# Patient Record
Sex: MALE | Race: WHITE | NOT HISPANIC OR LATINO | Employment: FULL TIME | URBAN - METROPOLITAN AREA
[De-identification: names, ages, dates, MRNs, and addresses within clinical notes are randomized per-mention and may not be internally consistent; named-entity substitution may affect disease eponyms.]

---

## 2021-06-15 ENCOUNTER — TELEPHONE (OUTPATIENT)
Dept: GASTROENTEROLOGY | Facility: CLINIC | Age: 36
End: 2021-06-15

## 2021-07-12 ENCOUNTER — OFFICE VISIT (OUTPATIENT)
Dept: GASTROENTEROLOGY | Facility: CLINIC | Age: 36
End: 2021-07-12
Payer: MEDICAID

## 2021-07-12 VITALS
WEIGHT: 216.19 LBS | BODY MASS INDEX: 30.95 KG/M2 | OXYGEN SATURATION: 98 % | SYSTOLIC BLOOD PRESSURE: 126 MMHG | HEIGHT: 70 IN | RESPIRATION RATE: 18 BRPM | DIASTOLIC BLOOD PRESSURE: 80 MMHG | HEART RATE: 79 BPM

## 2021-07-12 DIAGNOSIS — K29.50 CHRONIC GASTRITIS WITHOUT BLEEDING, UNSPECIFIED GASTRITIS TYPE: ICD-10-CM

## 2021-07-12 DIAGNOSIS — K21.00 HIATAL HERNIA WITH GERD AND ESOPHAGITIS: Primary | ICD-10-CM

## 2021-07-12 DIAGNOSIS — K44.9 HIATAL HERNIA WITH GERD AND ESOPHAGITIS: Primary | ICD-10-CM

## 2021-07-12 DIAGNOSIS — R68.81 EARLY SATIETY: ICD-10-CM

## 2021-07-12 PROCEDURE — 99203 PR OFFICE/OUTPT VISIT, NEW, LEVL III, 30-44 MIN: ICD-10-PCS | Mod: S$PBB,,, | Performed by: NURSE PRACTITIONER

## 2021-07-12 PROCEDURE — 99214 OFFICE O/P EST MOD 30 MIN: CPT | Mod: PBBFAC,PO | Performed by: NURSE PRACTITIONER

## 2021-07-12 PROCEDURE — 99999 PR PBB SHADOW E&M-EST. PATIENT-LVL IV: CPT | Mod: PBBFAC,,, | Performed by: NURSE PRACTITIONER

## 2021-07-12 PROCEDURE — 99999 PR PBB SHADOW E&M-EST. PATIENT-LVL IV: ICD-10-PCS | Mod: PBBFAC,,, | Performed by: NURSE PRACTITIONER

## 2021-07-12 PROCEDURE — 99203 OFFICE O/P NEW LOW 30 MIN: CPT | Mod: S$PBB,,, | Performed by: NURSE PRACTITIONER

## 2021-07-12 RX ORDER — SUCRALFATE 1 G/1
1 TABLET ORAL
Qty: 120 TABLET | Refills: 2 | Status: SHIPPED | OUTPATIENT
Start: 2021-07-12 | End: 2022-03-03 | Stop reason: ALTCHOICE

## 2021-07-12 RX ORDER — SIMETHICONE 180 MG
1 CAPSULE ORAL EVERY 12 HOURS PRN
COMMUNITY
Start: 2021-03-01 | End: 2022-03-03 | Stop reason: ALTCHOICE

## 2021-07-12 RX ORDER — METOCLOPRAMIDE 10 MG/1
10 TABLET ORAL EVERY 6 HOURS PRN
COMMUNITY
Start: 2021-04-22 | End: 2022-03-03 | Stop reason: ALTCHOICE

## 2021-07-12 RX ORDER — OMEPRAZOLE 40 MG/1
40 CAPSULE, DELAYED RELEASE ORAL DAILY
Qty: 30 CAPSULE | Refills: 5 | Status: SHIPPED | OUTPATIENT
Start: 2021-07-12 | End: 2022-03-03 | Stop reason: ALTCHOICE

## 2021-07-12 RX ORDER — CALCIUM CARBONATE 200(500)MG
1 TABLET,CHEWABLE ORAL DAILY
COMMUNITY
End: 2022-03-03 | Stop reason: ALTCHOICE

## 2021-07-21 ENCOUNTER — TELEPHONE (OUTPATIENT)
Dept: GASTROENTEROLOGY | Facility: CLINIC | Age: 36
End: 2021-07-21

## 2021-07-21 PROBLEM — K21.00 HIATAL HERNIA WITH GERD AND ESOPHAGITIS: Status: ACTIVE | Noted: 2021-07-21

## 2021-07-21 PROBLEM — E78.5 OTHER AND UNSPECIFIED HYPERLIPIDEMIA: Status: ACTIVE | Noted: 2020-12-23

## 2021-07-21 PROBLEM — K44.9 HIATAL HERNIA WITH GERD AND ESOPHAGITIS: Status: ACTIVE | Noted: 2021-07-21

## 2021-07-21 PROBLEM — I10 ESSENTIAL HYPERTENSION: Status: ACTIVE | Noted: 2020-03-16

## 2021-07-21 PROBLEM — K29.50 CHRONIC GASTRITIS WITHOUT BLEEDING: Status: ACTIVE | Noted: 2021-07-21

## 2021-07-21 PROBLEM — R68.81 EARLY SATIETY: Status: ACTIVE | Noted: 2021-07-21

## 2021-07-22 ENCOUNTER — TELEPHONE (OUTPATIENT)
Dept: GASTROENTEROLOGY | Facility: CLINIC | Age: 36
End: 2021-07-22

## 2021-07-22 RX ORDER — DICYCLOMINE HYDROCHLORIDE 20 MG/1
20 TABLET ORAL 3 TIMES DAILY PRN
Qty: 60 TABLET | Refills: 2 | Status: SHIPPED | OUTPATIENT
Start: 2021-07-22 | End: 2022-03-03 | Stop reason: ALTCHOICE

## 2021-08-06 ENCOUNTER — TELEPHONE (OUTPATIENT)
Dept: SURGERY | Facility: CLINIC | Age: 36
End: 2021-08-06

## 2021-08-06 ENCOUNTER — TELEPHONE (OUTPATIENT)
Dept: GASTROENTEROLOGY | Facility: CLINIC | Age: 36
End: 2021-08-06

## 2021-08-11 ENCOUNTER — TELEPHONE (OUTPATIENT)
Dept: GASTROENTEROLOGY | Facility: CLINIC | Age: 36
End: 2021-08-11

## 2021-08-11 ENCOUNTER — PATIENT MESSAGE (OUTPATIENT)
Dept: GASTROENTEROLOGY | Facility: CLINIC | Age: 36
End: 2021-08-11

## 2021-12-23 ENCOUNTER — OFFICE VISIT (OUTPATIENT)
Dept: SURGERY | Facility: CLINIC | Age: 36
End: 2021-12-23
Payer: MEDICAID

## 2021-12-23 ENCOUNTER — TELEPHONE (OUTPATIENT)
Dept: SURGERY | Facility: CLINIC | Age: 36
End: 2021-12-23

## 2021-12-23 VITALS
DIASTOLIC BLOOD PRESSURE: 85 MMHG | HEIGHT: 70 IN | BODY MASS INDEX: 30.89 KG/M2 | SYSTOLIC BLOOD PRESSURE: 127 MMHG | WEIGHT: 215.75 LBS | HEART RATE: 73 BPM

## 2021-12-23 DIAGNOSIS — R10.10 UPPER ABDOMINAL PAIN, UNSPECIFIED: ICD-10-CM

## 2021-12-23 DIAGNOSIS — K44.9 HIATAL HERNIA WITH GERD AND ESOPHAGITIS: Primary | ICD-10-CM

## 2021-12-23 DIAGNOSIS — K21.00 HIATAL HERNIA WITH GERD AND ESOPHAGITIS: Primary | ICD-10-CM

## 2021-12-23 PROCEDURE — 3074F SYST BP LT 130 MM HG: CPT | Mod: CPTII,,, | Performed by: SURGERY

## 2021-12-23 PROCEDURE — 3008F PR BODY MASS INDEX (BMI) DOCUMENTED: ICD-10-PCS | Mod: CPTII,,, | Performed by: SURGERY

## 2021-12-23 PROCEDURE — 1160F RVW MEDS BY RX/DR IN RCRD: CPT | Mod: CPTII,,, | Performed by: SURGERY

## 2021-12-23 PROCEDURE — 99203 OFFICE O/P NEW LOW 30 MIN: CPT | Mod: S$PBB,,, | Performed by: SURGERY

## 2021-12-23 PROCEDURE — 1159F MED LIST DOCD IN RCRD: CPT | Mod: CPTII,,, | Performed by: SURGERY

## 2021-12-23 PROCEDURE — 3008F BODY MASS INDEX DOCD: CPT | Mod: CPTII,,, | Performed by: SURGERY

## 2021-12-23 PROCEDURE — 99214 OFFICE O/P EST MOD 30 MIN: CPT | Mod: PBBFAC,PN | Performed by: SURGERY

## 2021-12-23 PROCEDURE — 1160F PR REVIEW ALL MEDS BY PRESCRIBER/CLIN PHARMACIST DOCUMENTED: ICD-10-PCS | Mod: CPTII,,, | Performed by: SURGERY

## 2021-12-23 PROCEDURE — 3079F DIAST BP 80-89 MM HG: CPT | Mod: CPTII,,, | Performed by: SURGERY

## 2021-12-23 PROCEDURE — 3079F PR MOST RECENT DIASTOLIC BLOOD PRESSURE 80-89 MM HG: ICD-10-PCS | Mod: CPTII,,, | Performed by: SURGERY

## 2021-12-23 PROCEDURE — 99999 PR PBB SHADOW E&M-EST. PATIENT-LVL IV: CPT | Mod: PBBFAC,,, | Performed by: SURGERY

## 2021-12-23 PROCEDURE — 3074F PR MOST RECENT SYSTOLIC BLOOD PRESSURE < 130 MM HG: ICD-10-PCS | Mod: CPTII,,, | Performed by: SURGERY

## 2021-12-23 PROCEDURE — 99203 PR OFFICE/OUTPT VISIT, NEW, LEVL III, 30-44 MIN: ICD-10-PCS | Mod: S$PBB,,, | Performed by: SURGERY

## 2021-12-23 PROCEDURE — 99999 PR PBB SHADOW E&M-EST. PATIENT-LVL IV: ICD-10-PCS | Mod: PBBFAC,,, | Performed by: SURGERY

## 2021-12-23 PROCEDURE — 1159F PR MEDICATION LIST DOCUMENTED IN MEDICAL RECORD: ICD-10-PCS | Mod: CPTII,,, | Performed by: SURGERY

## 2021-12-23 RX ORDER — TOPIRAMATE 50 MG/1
50 TABLET, FILM COATED ORAL 2 TIMES DAILY
COMMUNITY
Start: 2021-10-25 | End: 2022-03-03 | Stop reason: ALTCHOICE

## 2021-12-23 RX ORDER — DEXLANSOPRAZOLE 60 MG/1
60 CAPSULE, DELAYED RELEASE ORAL
COMMUNITY
End: 2023-02-01 | Stop reason: ALTCHOICE

## 2021-12-27 ENCOUNTER — TELEPHONE (OUTPATIENT)
Dept: SURGERY | Facility: CLINIC | Age: 36
End: 2021-12-27
Payer: MEDICAID

## 2021-12-29 ENCOUNTER — PATIENT MESSAGE (OUTPATIENT)
Dept: SURGERY | Facility: CLINIC | Age: 36
End: 2021-12-29
Payer: MEDICAID

## 2022-02-28 ENCOUNTER — PATIENT MESSAGE (OUTPATIENT)
Dept: SURGERY | Facility: CLINIC | Age: 37
End: 2022-02-28
Payer: MEDICAID

## 2022-02-28 ENCOUNTER — TELEPHONE (OUTPATIENT)
Dept: SURGERY | Facility: HOSPITAL | Age: 37
End: 2022-02-28
Payer: MEDICAID

## 2022-02-28 ENCOUNTER — HOSPITAL ENCOUNTER (OUTPATIENT)
Dept: RADIOLOGY | Facility: HOSPITAL | Age: 37
Discharge: HOME | End: 2022-02-28
Attending: SURGERY
Payer: MEDICAID

## 2022-02-28 DIAGNOSIS — K82.8 BILIARY DYSKINESIA: Primary | ICD-10-CM

## 2022-02-28 DIAGNOSIS — R10.10 UPPER ABDOMINAL PAIN, UNSPECIFIED: ICD-10-CM

## 2022-02-28 PROCEDURE — 78227 NM HEPATOBILIARY(HIDA) WITH PHARM AND EF: ICD-10-PCS | Mod: 26,,, | Performed by: RADIOLOGY

## 2022-02-28 PROCEDURE — 78227 HEPATOBIL SYST IMAGE W/DRUG: CPT | Mod: 26,,, | Performed by: RADIOLOGY

## 2022-02-28 PROCEDURE — A9537 TC99M MEBROFENIN: HCPCS

## 2022-02-28 RX ORDER — SODIUM CHLORIDE 9 MG/ML
INJECTION, SOLUTION INTRAVENOUS CONTINUOUS
Status: CANCELLED | OUTPATIENT
Start: 2022-02-28

## 2022-03-09 PROBLEM — K82.8 BILIARY DYSKINESIA: Status: ACTIVE | Noted: 2022-03-09

## 2022-03-09 RX ORDER — ONDANSETRON 4 MG/1
4 TABLET, FILM COATED ORAL EVERY 6 HOURS PRN
Qty: 12 TABLET | Refills: 0 | Status: SHIPPED | OUTPATIENT
Start: 2022-03-09 | End: 2023-01-11

## 2022-03-11 ENCOUNTER — PATIENT MESSAGE (OUTPATIENT)
Dept: SURGERY | Facility: CLINIC | Age: 37
End: 2022-03-11
Payer: MEDICAID

## 2022-03-14 ENCOUNTER — PATIENT MESSAGE (OUTPATIENT)
Dept: SURGERY | Facility: CLINIC | Age: 37
End: 2022-03-14
Payer: MEDICAID

## 2022-03-17 ENCOUNTER — OFFICE VISIT (OUTPATIENT)
Dept: SURGERY | Facility: CLINIC | Age: 37
End: 2022-03-17
Payer: MEDICAID

## 2022-03-17 DIAGNOSIS — K82.8 BILIARY DYSKINESIA: Primary | ICD-10-CM

## 2022-03-17 PROCEDURE — 99024 POSTOP FOLLOW-UP VISIT: CPT | Mod: ,,, | Performed by: SURGERY

## 2022-03-17 PROCEDURE — 99024 PR POST-OP FOLLOW-UP VISIT: ICD-10-PCS | Mod: ,,, | Performed by: SURGERY

## 2022-03-17 NOTE — PROGRESS NOTES
Telephone Post-op Appt:    Doing well  Preop symptoms are improved  No issues at present    Johnathon Graves M.D., F.A.C.S.  [facility]  [facility]

## 2023-02-01 PROBLEM — I49.1 PAC (PREMATURE ATRIAL CONTRACTION): Status: ACTIVE | Noted: 2023-02-01

## 2023-02-01 PROBLEM — G47.10 HYPERSOMNOLENCE: Status: ACTIVE | Noted: 2023-02-01

## 2023-11-24 ENCOUNTER — OFFICE VISIT (OUTPATIENT)
Dept: NEUROLOGY | Facility: CLINIC | Age: 38
End: 2023-11-24
Payer: COMMERCIAL

## 2023-11-24 VITALS
HEART RATE: 94 BPM | RESPIRATION RATE: 17 BRPM | TEMPERATURE: 98 F | BODY MASS INDEX: 29.46 KG/M2 | HEIGHT: 70 IN | WEIGHT: 205.81 LBS | SYSTOLIC BLOOD PRESSURE: 131 MMHG | DIASTOLIC BLOOD PRESSURE: 84 MMHG

## 2023-11-24 DIAGNOSIS — R51.9 CHRONIC NONINTRACTABLE HEADACHE, UNSPECIFIED HEADACHE TYPE: Primary | ICD-10-CM

## 2023-11-24 DIAGNOSIS — G89.29 CHRONIC NONINTRACTABLE HEADACHE, UNSPECIFIED HEADACHE TYPE: Primary | ICD-10-CM

## 2023-11-24 DIAGNOSIS — G47.9 TROUBLE IN SLEEPING: ICD-10-CM

## 2023-11-24 DIAGNOSIS — M79.18 MYOFASCIAL PAIN: ICD-10-CM

## 2023-11-24 PROCEDURE — 3075F SYST BP GE 130 - 139MM HG: CPT | Mod: CPTII,S$GLB,, | Performed by: PHYSICIAN ASSISTANT

## 2023-11-24 PROCEDURE — 99999 PR PBB SHADOW E&M-EST. PATIENT-LVL V: CPT | Mod: PBBFAC,,, | Performed by: PHYSICIAN ASSISTANT

## 2023-11-24 PROCEDURE — 3079F DIAST BP 80-89 MM HG: CPT | Mod: CPTII,S$GLB,, | Performed by: PHYSICIAN ASSISTANT

## 2023-11-24 PROCEDURE — 1159F PR MEDICATION LIST DOCUMENTED IN MEDICAL RECORD: ICD-10-PCS | Mod: CPTII,S$GLB,, | Performed by: PHYSICIAN ASSISTANT

## 2023-11-24 PROCEDURE — 1159F MED LIST DOCD IN RCRD: CPT | Mod: CPTII,S$GLB,, | Performed by: PHYSICIAN ASSISTANT

## 2023-11-24 PROCEDURE — 4010F PR ACE/ARB THEARPY RXD/TAKEN: ICD-10-PCS | Mod: CPTII,S$GLB,, | Performed by: PHYSICIAN ASSISTANT

## 2023-11-24 PROCEDURE — 1160F PR REVIEW ALL MEDS BY PRESCRIBER/CLIN PHARMACIST DOCUMENTED: ICD-10-PCS | Mod: CPTII,S$GLB,, | Performed by: PHYSICIAN ASSISTANT

## 2023-11-24 PROCEDURE — 3075F PR MOST RECENT SYSTOLIC BLOOD PRESS GE 130-139MM HG: ICD-10-PCS | Mod: CPTII,S$GLB,, | Performed by: PHYSICIAN ASSISTANT

## 2023-11-24 PROCEDURE — 4010F ACE/ARB THERAPY RXD/TAKEN: CPT | Mod: CPTII,S$GLB,, | Performed by: PHYSICIAN ASSISTANT

## 2023-11-24 PROCEDURE — 3079F PR MOST RECENT DIASTOLIC BLOOD PRESSURE 80-89 MM HG: ICD-10-PCS | Mod: CPTII,S$GLB,, | Performed by: PHYSICIAN ASSISTANT

## 2023-11-24 PROCEDURE — 99999 PR PBB SHADOW E&M-EST. PATIENT-LVL V: ICD-10-PCS | Mod: PBBFAC,,, | Performed by: PHYSICIAN ASSISTANT

## 2023-11-24 PROCEDURE — 99205 PR OFFICE/OUTPT VISIT, NEW, LEVL V, 60-74 MIN: ICD-10-PCS | Mod: S$GLB,,, | Performed by: PHYSICIAN ASSISTANT

## 2023-11-24 PROCEDURE — 1160F RVW MEDS BY RX/DR IN RCRD: CPT | Mod: CPTII,S$GLB,, | Performed by: PHYSICIAN ASSISTANT

## 2023-11-24 PROCEDURE — 3008F BODY MASS INDEX DOCD: CPT | Mod: CPTII,S$GLB,, | Performed by: PHYSICIAN ASSISTANT

## 2023-11-24 PROCEDURE — 3008F PR BODY MASS INDEX (BMI) DOCUMENTED: ICD-10-PCS | Mod: CPTII,S$GLB,, | Performed by: PHYSICIAN ASSISTANT

## 2023-11-24 PROCEDURE — 99205 OFFICE O/P NEW HI 60 MIN: CPT | Mod: S$GLB,,, | Performed by: PHYSICIAN ASSISTANT

## 2023-11-24 RX ORDER — TIZANIDINE 2 MG/1
TABLET ORAL
Qty: 30 TABLET | Refills: 4 | Status: SHIPPED | OUTPATIENT
Start: 2023-11-24 | End: 2024-01-08

## 2023-11-24 RX ORDER — PREDNISONE 20 MG/1
TABLET ORAL
Qty: 12 TABLET | Refills: 0 | Status: SHIPPED | OUTPATIENT
Start: 2023-11-24 | End: 2024-01-08

## 2023-11-24 NOTE — PROGRESS NOTES
[facility]  [address] [place] [address]  Phone:[phone]  Fax: [phone]   New Patient Consultation    Patient Name: Tavon Miller  : 1985  MRN:  [id number]  Today: 2023   chief complaint: Headache    PCP: Ronny Sewell MD.       Assessment:   Tavon Miller is a 38 y.o. right handed male with a PMHx of: HTN, premature atrial contraction and HALE   whom presents solo at the request of cardiologist for HA and premature atrial contractions (? Will defer to his cardiologist). HA appear to be tension quality, at times migrainous with some super imposed stabbing HA. I will have him keep a HA log to better qualify. Lack of sleep and myofascial pain likely contribute.  He would like try and avoid having to take medicine on a regular basis.       Review:    ICD-10-CM ICD-9-CM   1. Chronic nonintractable headache, unspecified headache type  R51.9 784.0    G89.29    2. Myofascial pain  M79.18 729.1   3. Trouble in sleeping  G47.9 780.50         Plan:   Discussed realistic goals of care with patient at length. Discussed medication options, need for lifestyle adjustment. Discussed treatment will take time. Goal will be to reduce frequency/intensity/quantity of HA, not to be completely HA free. Gave copy of [facility] triggers for migraine informational sheet (N.b., a standard I give to patients who come to seek my care in HA clinic, regardless if they have migraines or not) and discussed clinic's non narcotic policy re: HA. Patient voiced understanding and agreement.            -will have patient track HA, discussed karen for smart phone           -will have patient work on lifestyle           -if HA change in quality/nature, will get updated imaging study   For HA Prevention:  1 short course of zanaflex written, discussed adv effects/dosing, not to use with etoh, no driving, try for two weeks then switch to PRN Q2h before bed as needed for neck  pain/HA, he agreed  2 cardiac meds per other providers       For HA :  Limit OTC to <3 days use in week     To break up Headaches:  Course of deltasone to start tomorrow, discussed taper schedule (wrote it out with read back), take on full stomach at breakfast time only, take until completion and discussed side effects. The patient agreed.       Other:  N/a           All test results as well as any necessary instructions were reviewed and discussed with patient.    Review:  Orders Placed This Encounter    predniSONE (DELTASONE) 20 MG tablet    tiZANidine (ZANAFLEX) 2 MG tablet         Patient to return to PCP/other specialists for all other problems  Patient to continue on all medications as Rx'd   A detailed AVS was provided to the patient with patient readback   RTO- 6 weeks to check in   The patient indicates understanding of these issues and agrees to the plan.    HPI:   Tavon Miller is a 38 y.o.right handed, male with a PMHx of: HTN, premature atrial contraction and HALE   whom presents solo at the request of cardiologist for HA and premature atrial contractions (?)     HA HPI:  Start:HA for 2 years, past 2 months more frequent  History of trauma (no), History of CNS infection (no), History of Stroke (no)  Location: changes location   Severity: range: 1-7/10  Duration:all day long, at times will have bursts of stabbing pain (again, changes location) with a pain that lasts a few seconds but has lingering ill effects for light headedness or fatigue for a few hours   Frequency:daily   Associated factors (bolded positive) WITH HA ( or migraine): Nausea, vomiting, photophobia light headedness, fatigue,  phonophobia, tinnitus, scalp pain, vision loss, diplopia, scintillations, eye pain, jaw pain, weakness?    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      Lactobacillus rhamnosus GG (CULTURELLE) 10 billion cell capsule Take 1 capsule by mouth once daily.      pantoprazole (PROTONIX) 40 MG tablet Take 1 tablet (40 mg total) by mouth 2 (two) times daily. (Patient not taking: Reported on 11/24/2023) 60 tablet 0    predniSONE (DELTASONE) 20 MG tablet On full stomach (breakfast): 3 tabs for 2 days, 2 tabs for 2 days, 1 tab for 2 days. Finish 12 tablet 0    tiZANidine (ZANAFLEX) 2 MG tablet 1 pill approx 1-2 hours before bed, as needed for neck pain and headaches 30 tablet 4     No current facility-administered medications for this visit.     Review of patient's allergies indicates:   Allergen Reactions    Hydroxyzine Swelling     Throat swelling  Throat swelling      Prazosin Anaphylaxis       PMHx:  Past Medical History:   Diagnosis Date    H. pylori infection      Past Surgical History:   Procedure Laterality Date    ESOPHAGOGASTRODUODENOSCOPY      LAPAROSCOPIC CHOLECYSTECTOMY N/A 3/9/2022    Procedure: CHOLECYSTECTOMY, LAPAROSCOPIC;  Surgeon: Johnathon Graves MD;  Location: [facility];  Service: General;  Laterality: N/A;       Fhx:no HA in family   No family history on file.    Shx: 8-10 guerra lights in a week, , no drugs    Social History     Socioeconomic History    Marital status:    Tobacco Use    Smoking status: Former    Smokeless tobacco: Former   Substance and Sexual Activity    Alcohol use: Not Currently    Drug use: Never    Sexual activity: Yes     Partners: Female           Labs:   CMP  Sodium   Date Value Ref Range Status   01/03/2022 138 136 - 144 mmol/L Final   11/12/2020 139 136 - 145 mmol/L Final     Potassium   Date Value Ref Range Status   01/03/2022 3.8 3.6 - 5.1 mmol/L Final   11/12/2020 3.9 3.5 - 5.1 mmol/L Final     Chloride   Date Value Ref Range Status   11/12/2020 102 95 - 110 mmol/L Final     CO2   Date Value Ref Range Status   01/03/2022 24 22 - 32 mmol/L Final   11/12/2020 27 22 - 31 mmol/L Final     Glucose   Date Value Ref Range Status   11/12/2020 109 70 - 110 mg/dL Final     Comment:     The ADA recommends the following guidelines for fasting glucose:  Normal:       less than 100 mg/dL  Prediabetes:  100 mg/dL to 125 mg/dL  Diabetes:     126 mg/dL or higher       BUN   Date Value Ref Range Status   11/12/2020 15 9 - 21 mg/dL Final     Blood Urea Nitrogen   Date Value Ref Range Status   01/03/2022 11 8 - 20 mg/dL Final     Creatinine   Date Value Ref Range Status   01/03/2022 0.77 (L) 0.90 - 1.30 mg/dL Final   11/12/2020 0.67 0.50 - 1.40 mg/dL Final     Calcium   Date Value Ref Range Status   01/03/2022 9.4 8.9 - 10.3 mg/dL Final   11/12/2020 11.2 (H) 8.4 - 10.2 mg/dL Final     Total Protein   Date Value Ref Range Status   01/02/2022 6.2 6.1 - 7.9 g/dL Final   11/12/2020 8.3 6.0 - 8.4 g/dL Final     Albumin   Date Value Ref Range Status   01/02/2022 3.5 3.5 - 4.8 g/dL Final   11/12/2020 4.8 3.5 - 5.2 g/dL Final     Total Bilirubin   Date Value Ref Range Status   01/02/2022 0.3 (L) 0.4 - 2.0 mg/dL Final   11/12/2020 0.8 0.2 - 1.3 mg/dL Final     Alkaline Phosphatase   Date Value Ref Range Status   01/02/2022 88 28 - 116 U/L Final   11/12/2020 109 38 - 145 U/L Final     AST   Date Value Ref Range  Status   2022 23 12 - 40 U/L Final   2020 43 17 - 59 U/L Final     ALT   Date Value Ref Range Status   2022 27 5 - 56 U/L Final   2020 38 0 - 50 U/L Final     Anion Gap   Date Value Ref Range Status   2022 8 7 - 16 mmol/L Final   2020 10 8 - 16 mmol/L Final         Imagin2022 MRI brain (rad report only/care everywhere tabe):  1.  No MRI evidence of acute intracranial abnormality.     2.  Additional incidental, age-related, and/or chronic findings, as described above.        Electronically signed by Breonna Rodriguez MD on 2022 11:22 AM  Narrative    REASON FOR EXAM: Neuro deficit, acute, stroke suspected / Headache, chronic, new features or increased frequency / Headache, new or worsening, neuro deficit (Age 19-49y)    TECHNICAL FACTORS: Multiplanar, multisequence MRI of the brain was performed before and after administration of intravenous contrast.    COMPARISON: None    FINDINGS:  The ventricles are normal in size and position.  There is no evidence of acute intracranial hemorrhage or infarct.  There is no evidence of mass, mass effect, or midline shift.  There is no evidence of abnormal enhancement. The visualized  orbits are normal in appearance.  There is trace fluid in the right maxillary sinus. There are small bilateral mucoceles or mucous retention cysts in the bilateral maxillary sinuses. Normal flow voids are present. There is a well-circumscribed osseous  nodule in the left skull base anteriorly (4-7), which does not demonstrate enhancement, restricted diffusion, or other specific suspicious features. This finding demonstrates mild T2 hyperintensity and may represent hemangioma or other benign osseous      Other testing:  Reviewed in chart     Note: I have independently reviewed any/all imaging/labs/tests and agree with the report (s) as documented.  Any discrepancies will be as noted/demarcated by free text.  PADMAJA COPPOLA 2023                     ROS:  "  Review Of Systems (questions asked, positive or additions in BOLD)  Gen: Weight change, fatigue/malaise, pyrexia   HEENT: Tinnitus, headache,  blurred vision, eye pain, diplopia, photophobia,  nose bleeds, congestion, sore throat, jaw pain, scalp pain, neck stiffness   Card: Palpitations, CP   Pulm: SOB, cough   Vas: Easy bruising, easy bleeding   GI: N/V/D/C, incontinence, hematemesis, hematochezia    : incontinence, hematuria   Endocrine: Temp intolerance, polyuria, polydipsia   M/S: Neck pain, myalgia, back pain, joint pain, falls    Neuro: PER HPI   PSY: Memory loss, confusion, depression, anxiety, trouble in sleep, hallucinations          EXAM:   /84 (BP Location: Left arm, Patient Position: Sitting, BP Method: Large (Automatic))   Pulse 94   Temp 97.8 °F (36.6 °C)   Resp 17   Ht 5' 10" (1.778 m)   Wt 93.3 kg (205 lb 12.8 oz)   BMI 29.53 kg/m²    GEN:  NAD  HEENT: NC/AT, Frontalis was NTTP, temporalis was NTTP, vertex NTTP,  nares patent, dentition appropriate, , neck supple, trachea midline, Occiput and trapezius  TTP     EXTREM:  no edema present.  RUE 5th digit "trigger finger"   NEURO:  Mental Status:  Awake, alert and appropriately oriented to time, place, and person.  Normal attention and concentration.  Speech is fluent and appropriate language function (I.e., comprehension).     Cranial Nerves   Pupils are equal and reactive to light.  Extraocular movements are intact and without nystagmus.  Visual fields are full to confrontation testing.   Facial movement is symmetric.  Facial sensation is intact.  Hearing is normal. Uvula in midline. FROM of neck in all (6) directions, shoulder shrug symmetrical. Tongue in midline without fasiculation.     Motor:  RUE:appropriate against gravity and medium force as tested 5/5              LUE: appropriate against gravity and medium force as tested 5/5              RLE:appropriate against gravity and medium force as tested 5/5              LLE: " appropriate against gravity and medium force as tested 5/5  Tremor/pronator drift not apparent.     finger extension strength was strong bilat     Sensory:  RUE  intact light touch, proprioception, and temperature  LUE intact light touch, proprioception, and temperature    RLE intact light touch  LLE intact light touch      DTR's:                                            R              L  biceps 1+ 1+         brachioradialis 0 0   Knee jerk 2+ 2+        Coordination:  FTN-WNL.      Gait and Stance: Normal manner of stance and gait function testing. Romberg was negative.          This document has been electronically signed by Mr. Mariano Kendall MPA, PA-C on 11/24/2023, I have personally typed this message using the EMR.       Dr Jason MD was available during today's visit.     Personal Protective Equipment:    Personal Protective Equipment was used during this encounter including:   non latex gloves.          CC: Ronny Sewell MD/Dr. Han (Cardio)

## 2023-11-24 NOTE — PATIENT INSTRUCTIONS
"        Consider migraine buddy free karen for android to track those headaches      To reduce headaches:  Try the tizanadine (zanaflex), this is a muscle relaxant, 1 pill approx 2 hours before bed every night for two weeks, then switch to 1 pill 2 hours before bed as needed for neck pain and headaches, do not use with alcohol and do not drive with it      To "factory reset"  Try the deltasone (prednisone), this is a steroid, on full stomach at breakfast time only: 3 pills for 2 days, 2 pills for 2 days, then 1 pill for 2 days then off         "

## 2024-01-08 ENCOUNTER — OFFICE VISIT (OUTPATIENT)
Dept: NEUROLOGY | Facility: CLINIC | Age: 39
End: 2024-01-08
Payer: COMMERCIAL

## 2024-01-08 VITALS
HEART RATE: 71 BPM | BODY MASS INDEX: 29.32 KG/M2 | HEIGHT: 70 IN | WEIGHT: 204.81 LBS | DIASTOLIC BLOOD PRESSURE: 83 MMHG | SYSTOLIC BLOOD PRESSURE: 120 MMHG | RESPIRATION RATE: 17 BRPM | TEMPERATURE: 98 F

## 2024-01-08 DIAGNOSIS — R51.9 CHRONIC NONINTRACTABLE HEADACHE, UNSPECIFIED HEADACHE TYPE: Primary | ICD-10-CM

## 2024-01-08 DIAGNOSIS — M79.18 MYOFASCIAL PAIN: ICD-10-CM

## 2024-01-08 DIAGNOSIS — G89.29 CHRONIC NONINTRACTABLE HEADACHE, UNSPECIFIED HEADACHE TYPE: Primary | ICD-10-CM

## 2024-01-08 PROCEDURE — 3074F SYST BP LT 130 MM HG: CPT | Mod: CPTII,S$GLB,, | Performed by: PHYSICIAN ASSISTANT

## 2024-01-08 PROCEDURE — 3008F BODY MASS INDEX DOCD: CPT | Mod: CPTII,S$GLB,, | Performed by: PHYSICIAN ASSISTANT

## 2024-01-08 PROCEDURE — 99999 PR PBB SHADOW E&M-EST. PATIENT-LVL IV: CPT | Mod: PBBFAC,,, | Performed by: PHYSICIAN ASSISTANT

## 2024-01-08 PROCEDURE — 99213 OFFICE O/P EST LOW 20 MIN: CPT | Mod: S$GLB,,, | Performed by: PHYSICIAN ASSISTANT

## 2024-01-08 PROCEDURE — 1159F MED LIST DOCD IN RCRD: CPT | Mod: CPTII,S$GLB,, | Performed by: PHYSICIAN ASSISTANT

## 2024-01-08 PROCEDURE — 1160F RVW MEDS BY RX/DR IN RCRD: CPT | Mod: CPTII,S$GLB,, | Performed by: PHYSICIAN ASSISTANT

## 2024-01-08 PROCEDURE — 3079F DIAST BP 80-89 MM HG: CPT | Mod: CPTII,S$GLB,, | Performed by: PHYSICIAN ASSISTANT

## 2024-01-08 NOTE — PROGRESS NOTES
"  [facility]  [address] [facility] [address]  [place] [address]  Phone:[phone]  Fax: [phone]  Follow up visit     Patient Name: Tavon Miller  : 1985  MRN:  [id number]  Today: 2024   LOV: 2023  chief complaint: Headache    PCP: Ronny Sewell MD.       Assessment:   Tavon Miller is a 38 y.o. right handed male with a PMHx of: HTN, premature atrial contraction and HA, presents solo in follow up for HALE. HALE appear at preset to be HA appear to be tension quality (short lived). As his "stabbing pain" changes location, not typcial for "ice pick HA".   Lack of sleep and myofascial pain likely contribute.  He would like try and avoid having to take medicine on a regular basis.       Review:    ICD-10-CM ICD-9-CM   1. Chronic nonintractable headache, unspecified headache type  R51.9 784.0    G89.29    2. Myofascial pain  M79.18 729.1           Plan:               -if HA change in quality/nature, will get updated imaging study (noted he previously said HA were all day long, now just a few seconds a day, which is an improvement).     For HA Prevention:  1 declined any and all medicine  2 cardiac meds per other providers  3 Suggested that the patient research out OTC supplements (stressed NOT FDA regulated and should take at own risk).    To help prevent a headache:   Petadolex (butterbur root)  Vitamin B2 (riboflavin)  CoQ10  Magnesium  "Migraeeze" which is petadolex/Vitamin B2/radha  "Dolovent" which is magnesium/Vitamin B2/coq10    * all of the above can be found locally in a variety of shops or on the internet   4 can stop zanaflex      For HA :  Limit OTC to <3 days use in week     To break up Headaches:  N/a       Other:  Work note written for him/given to him          All test results as well as any necessary instructions were reviewed and discussed with patient.    Review:           Patient to return to PCP/other specialists for all other " "problems  Patient to continue on all medications as Rx'd  Full office note available online for his review   RTO- PRN   The patient indicates understanding of these issues and agrees to the plan.    HPI:   Tavon Miller is a 38 y.o.right handed, male with a PMHx of: HTN, premature atrial contraction and HALE   whom presents solo in follow up for HA.     At last visit: prednisone and zanaflex written.   Stabbing pain, lasts a few seconds, then goes away   No other HA  Changes location   4+ episodes in a day  Zanaflex no help  No help from steroids   Denies weakness, sensory changes, falls, or any other concerns  "I don't want to take any medicines"   Asks for work note  No HA in office     HA HPI:  Start:HA for 2 years, past 2 months more frequent  History of trauma (no), History of CNS infection (no), History of Stroke (no)  Location: changes location   Severity: range: 1-7/10  Duration:all day long, at times will have bursts of stabbing pain (again, changes location) with a pain that lasts a few seconds but has lingering ill effects for light headedness or fatigue for a few hours   Frequency:daily   Associated factors (bolded positive) WITH HA ( or migraine): Nausea, vomiting, photophobia light headedness, fatigue,  phonophobia, tinnitus, scalp pain, vision loss, diplopia, scintillations, eye pain, jaw pain, weakness?    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      Lactobacillus rhamnosus GG (CULTURELLE) 10 billion cell capsule Take 1 capsule by mouth once daily.      lisinopriL (PRINIVIL,ZESTRIL) 20 MG tablet TAKE 1 TABLET BY MOUTH DAILY FOR BLOOD PRESSURE 30 tablet 3    pantoprazole (PROTONIX) 40 MG tablet Take 1 tablet (40 mg total) by mouth 2 (two) times daily. (Patient not taking: Reported on 11/24/2023) 60 tablet 0    predniSONE (DELTASONE) 20 MG tablet On full stomach (breakfast): 3 tabs for 2 days, 2 tabs for 2 days, 1 tab for 2 days. Finish 12 tablet 0    tiZANidine (ZANAFLEX) 2 MG tablet 1 pill approx 1-2 hours before bed, as needed for neck pain and headaches 30 tablet 4     No current facility-administered medications for this visit.     Review of patient's allergies indicates:   Allergen Reactions    Hydroxyzine Swelling     Throat swelling  Throat swelling      Prazosin Anaphylaxis       PMHx:  Past Medical History:   Diagnosis Date    H. pylori infection      Past Surgical History:   Procedure Laterality Date    ESOPHAGOGASTRODUODENOSCOPY      LAPAROSCOPIC CHOLECYSTECTOMY N/A 3/9/2022    Procedure: CHOLECYSTECTOMY, LAPAROSCOPIC;  Surgeon: Johnathon Graves MD;  Location: [facility];  Service: General;  Laterality: N/A;       Fhx:no HA in family   No family history on file.    Shx: 8-10 guerra lights in a week, , no drugs   Social History     Socioeconomic History    Marital status:    Tobacco Use    Smoking status: Former    Smokeless tobacco: Former   Substance and Sexual Activity    Alcohol use: Not Currently    Drug use: Never    Sexual activity: Yes     Partners: Female           Labs:   CMP  Sodium   Date Value Ref Range Status   01/03/2022 138 136 - 144 mmol/L Final   11/12/2020 139  136 - 145 mmol/L Final     Potassium   Date Value Ref Range Status   2022 3.8 3.6 - 5.1 mmol/L Final   2020 3.9 3.5 - 5.1 mmol/L Final     Chloride   Date Value Ref Range Status   2020 102 95 - 110 mmol/L Final     CO2   Date Value Ref Range Status   2022 24 22 - 32 mmol/L Final   2020 27 22 - 31 mmol/L Final     Glucose   Date Value Ref Range Status   2020 109 70 - 110 mg/dL Final     Comment:     The ADA recommends the following guidelines for fasting glucose:  Normal:       less than 100 mg/dL  Prediabetes:  100 mg/dL to 125 mg/dL  Diabetes:     126 mg/dL or higher       BUN   Date Value Ref Range Status   2020 15 9 - 21 mg/dL Final     Blood Urea Nitrogen   Date Value Ref Range Status   2022 11 8 - 20 mg/dL Final     Creatinine   Date Value Ref Range Status   2022 0.77 (L) 0.90 - 1.30 mg/dL Final   2020 0.67 0.50 - 1.40 mg/dL Final     Calcium   Date Value Ref Range Status   2022 9.4 8.9 - 10.3 mg/dL Final   2020 11.2 (H) 8.4 - 10.2 mg/dL Final     Total Protein   Date Value Ref Range Status   2022 6.2 6.1 - 7.9 g/dL Final   2020 8.3 6.0 - 8.4 g/dL Final     Albumin   Date Value Ref Range Status   2022 3.5 3.5 - 4.8 g/dL Final   2020 4.8 3.5 - 5.2 g/dL Final     Total Bilirubin   Date Value Ref Range Status   2022 0.3 (L) 0.4 - 2.0 mg/dL Final   2020 0.8 0.2 - 1.3 mg/dL Final     Alkaline Phosphatase   Date Value Ref Range Status   2022 88 28 - 116 U/L Final   2020 109 38 - 145 U/L Final     AST   Date Value Ref Range Status   2022 23 12 - 40 U/L Final   2020 43 17 - 59 U/L Final     ALT   Date Value Ref Range Status   2022 27 5 - 56 U/L Final   2020 38 0 - 50 U/L Final     Anion Gap   Date Value Ref Range Status   2022 8 7 - 16 mmol/L Final   2020 10 8 - 16 mmol/L Final         Imagin2022 MRI brain (rad report only/care everywhere tabe):  1.  No MRI  evidence of acute intracranial abnormality.     2.  Additional incidental, age-related, and/or chronic findings, as described above.        Electronically signed by Breonna Rodriguez MD on 1/1/2022 11:22 AM  Narrative    REASON FOR EXAM: Neuro deficit, acute, stroke suspected / Headache, chronic, new features or increased frequency / Headache, new or worsening, neuro deficit (Age 19-49y)    TECHNICAL FACTORS: Multiplanar, multisequence MRI of the brain was performed before and after administration of intravenous contrast.    COMPARISON: None    FINDINGS:  The ventricles are normal in size and position.  There is no evidence of acute intracranial hemorrhage or infarct.  There is no evidence of mass, mass effect, or midline shift.  There is no evidence of abnormal enhancement. The visualized  orbits are normal in appearance.  There is trace fluid in the right maxillary sinus. There are small bilateral mucoceles or mucous retention cysts in the bilateral maxillary sinuses. Normal flow voids are present. There is a well-circumscribed osseous  nodule in the left skull base anteriorly (4-7), which does not demonstrate enhancement, restricted diffusion, or other specific suspicious features. This finding demonstrates mild T2 hyperintensity and may represent hemangioma or other benign osseous      Other testing:  Reviewed in chart     Note: I have independently reviewed any/all imaging/labs/tests and agree with the report (s) as documented.  Any discrepancies will be as noted/demarcated by free text.  PADMAJA COPPOLA 1/8/2024                     ROS:   Review Of Systems (questions asked, positive or additions in BOLD)  Gen: Weight change, fatigue/malaise, pyrexia   HEENT: Tinnitus, headache,  blurred vision, eye pain, diplopia, photophobia,  nose bleeds, congestion, sore throat, jaw pain, scalp pain, neck stiffness   Card: Palpitations, CP   Pulm: SOB, cough   Vas: Easy bruising, easy bleeding   GI: N/V/D/C, incontinence,  "hematemesis, hematochezia    : incontinence, hematuria   Endocrine: Temp intolerance, polyuria, polydipsia   M/S: Neck pain, myalgia, back pain, joint pain, falls    Neuro: PER HPI   PSY: Memory loss, confusion, depression, anxiety, trouble in sleep, hallucinations          EXAM:   /83   Pulse 71   Temp 97.8 °F (36.6 °C)   Resp 17   Ht 5' 10" (1.778 m)   Wt 92.9 kg (204 lb 12.9 oz)   BMI 29.39 kg/m²    GEN:  NAD  HEENT: NC/AT   EXTREM:  no edema present.  RUE 5th digit "trigger finger"   NEURO:  Mental Status:  Awake, alert and appropriately oriented to time, place, and person.  Normal attention and concentration.  Speech is fluent and appropriate language function (I.e., comprehension).     Cranial Nerves  Extraocular movements are intact and without nystagmus.  Visual fields are full to confrontation testing.   Facial movement is symmetric.  Facial sensation is intact.  Hearing is normal. Uvula in midline. FROM of neck in all (6) directions, shoulder shrug symmetrical. Tongue in midline without fasiculation.     Motor:  antigravity in all limbs  No drift  No resting tremor        DTR's:                                            R              L                  Knee jerk 2+ 2+           Gait and Stance: Normal manner of stance and gait function testing.      This document has been electronically signed by Mr. Mariano Kendall MPA, PA-C on 1/8/2024, I have personally typed this message using the EMR.       Dr Jason MD was available during today's visit.     Personal Protective Equipment:    Personal Protective Equipment was used during this encounter including:   non latex gloves.          CC: Ronny Sewell MD           "

## 2024-01-08 NOTE — LETTER
January 8, 2024    Tavon Miller  [address] [facility] [address] [facility] [address]         [place] [facility]  [address] [facility] [address]  Phone: [phone]  Fax: [phone] January 8, 2024     Patient: Tavon Miller   YOB: 1985   Date of Visit: 1/8/2024       To Whom It May Concern:    It is my medical opinion that Tavon Miller may return to work on 1/9/2024 . Please excuse any missed absence from today.     If you have any questions or concerns, please don't hesitate to call.    Regards,        Mariano Kendall MPA, PA-C

## 2024-01-08 NOTE — PATIENT INSTRUCTIONS
"Suggested that the patient research out OTC supplements (stressed NOT FDA regulated and should take at own risk).    To help prevent a headache:   Petadolex (butterbur root)  Vitamin B2 (riboflavin)  CoQ10  Magnesium  "Migraeeze" which is petadolex/Vitamin B2/radha  "Dolovent" which is magnesium/Vitamin B2/coq10    * all of the above can be found locally in a variety of shops or on the internet          "

## 2024-08-12 ENCOUNTER — TELEPHONE (OUTPATIENT)
Dept: FAMILY MEDICINE | Facility: CLINIC | Age: 39
End: 2024-08-12
Payer: COMMERCIAL

## 2024-08-12 NOTE — TELEPHONE ENCOUNTER
Returned call to Dr. Lyndsay Contreras requesting to talk to Dr. Sheets in reference to mutual pt.  Informed pt is seen at [facility] not [facility].  Message to be forward to Dr. Sheets.

## 2024-08-12 NOTE — TELEPHONE ENCOUNTER
----- Message from Aundrea Figueroa sent at 8/12/2024  2:43 PM CDT -----  Type:  Call    Who Called:pt  Does the patient know what this is regarding?:call  Would the patient rather a call back or a response via [facility]? call  Best Call Back Number:[phone]  Additional Information: Dr. Lyndsay Contreras would like to speak with Dr Sheets

## 2025-01-24 DIAGNOSIS — K31.84 GASTROPARESIS: ICD-10-CM

## 2025-01-24 DIAGNOSIS — R11.10 VOMITING: Primary | ICD-10-CM

## 2025-01-24 DIAGNOSIS — R19.7 DIARRHEA: ICD-10-CM

## 2025-02-05 ENCOUNTER — HOSPITAL ENCOUNTER (OUTPATIENT)
Dept: RADIOLOGY | Facility: HOSPITAL | Age: 40
Discharge: HOME | End: 2025-02-05
Attending: SURGERY
Payer: COMMERCIAL

## 2025-02-05 DIAGNOSIS — K31.84 GASTROPARESIS: ICD-10-CM

## 2025-02-05 DIAGNOSIS — R11.10 VOMITING: ICD-10-CM

## 2025-02-05 DIAGNOSIS — R19.7 DIARRHEA: ICD-10-CM

## 2025-02-05 PROCEDURE — 78264 GASTRIC EMPTYING IMG STUDY: CPT | Mod: 52,TC

## 2025-02-05 PROCEDURE — A9541 TC99M SULFUR COLLOID: HCPCS

## 2025-02-05 PROCEDURE — 78264 GASTRIC EMPTYING IMG STUDY: CPT | Mod: 26,52,, | Performed by: RADIOLOGY

## 2025-02-05 RX ORDER — TECHNETIUM TC 99M SULFUR COLLOID 2 MG
1 KIT MISCELLANEOUS
Status: COMPLETED | OUTPATIENT
Start: 2025-02-05 | End: 2025-02-05

## 2025-02-05 RX ADMIN — TECHNETIUM TC 99M SULFUR COLLOID KIT 1 MILLICURIE: KIT at 09:02
